# Patient Record
Sex: FEMALE | Race: WHITE | NOT HISPANIC OR LATINO | Employment: FULL TIME | URBAN - METROPOLITAN AREA
[De-identification: names, ages, dates, MRNs, and addresses within clinical notes are randomized per-mention and may not be internally consistent; named-entity substitution may affect disease eponyms.]

---

## 2022-03-24 ENCOUNTER — OFFICE VISIT (OUTPATIENT)
Dept: URGENT CARE | Facility: URGENT CARE | Age: 22
End: 2022-03-24
Payer: COMMERCIAL

## 2022-03-24 VITALS
HEART RATE: 94 BPM | WEIGHT: 188 LBS | SYSTOLIC BLOOD PRESSURE: 142 MMHG | TEMPERATURE: 98.7 F | OXYGEN SATURATION: 97 % | DIASTOLIC BLOOD PRESSURE: 89 MMHG

## 2022-03-24 DIAGNOSIS — J02.9 VIRAL PHARYNGITIS: Primary | ICD-10-CM

## 2022-03-24 LAB — DEPRECATED S PYO AG THROAT QL EIA: NEGATIVE

## 2022-03-24 PROCEDURE — 99212 OFFICE O/P EST SF 10 MIN: CPT | Performed by: STUDENT IN AN ORGANIZED HEALTH CARE EDUCATION/TRAINING PROGRAM

## 2022-03-24 PROCEDURE — 87651 STREP A DNA AMP PROBE: CPT | Performed by: STUDENT IN AN ORGANIZED HEALTH CARE EDUCATION/TRAINING PROGRAM

## 2022-03-24 RX ORDER — ALBUTEROL SULFATE 0.83 MG/ML
2.5 SOLUTION RESPIRATORY (INHALATION)
COMMUNITY
Start: 2021-04-02

## 2022-03-24 NOTE — PROGRESS NOTES
ASSESSMENT/PLAN:  (J02.9) Viral pharyngitis  (primary encounter diagnosis)  Comment: Sore throat for last 2 days with friend that was positive for strep throat yesterday.  Also with rhinorrhea and sinus congestion.  History of allergies.  Rapid strep negative awaiting confirmation of final.  Overall most consistent with viral pharyngitis  Plan:   -Streptococcus A Rapid Screen w/Reflex to PCR - Clinic Collect, Group A Streptococcus PCR Throat Swab  -Counseled on supportive cares including OTC meds      Appropriate PPE worn.    Options for treatment and follow-up care were reviewed with the patient and/or guardian. Alannah Rubio and/or guardian engaged in the decision making process and verbalized understanding of the options discussed and agreed with the final plan.    See [facility] for orders, medications, letters, patient instructions  This note was dictated with Fiestah.      Edward Monge MD    SUBJECTIVE:  Alannah Rubio is an 21 year old female who presents for sore throat.    Started 2 days ago.   Thought at first it was just allergies but sore throat has persisted.  Also with some rhinorrhea and nasal congestion but denies other viral symptoms.  Friend was just diagnosed with strep throat.  Patient has had strep throat before but it has been a while.  Still has tonsils and.  No fever chills at home.  Has not been taking anything.    PMH:   has no past medical history on file.  There is no problem list on file for this patient.    Social History     Socioeconomic History     Marital status: Single     Spouse name: Not on file     Number of children: Not on file     Years of education: Not on file     Highest education level: Not on file   Occupational History     Not on file   Tobacco Use     Smoking status: Never Smoker     Smokeless tobacco: Never Used   Substance and Sexual Activity     Alcohol use: Not on file     Drug use: Not on file     Sexual activity: Not on file   Other Topics  Concern     Not on file   Social History Narrative     Not on file     Social Determinants of Health     Financial Resource Strain: Not on file   Food Insecurity: Not on file   Transportation Needs: Not on file   Physical Activity: Not on file   Stress: Not on file   Social Connections: Not on file   Intimate Partner Violence: Not on file   Housing Stability: Not on file     No family history on file.    ALLERGIES:  Patient has no known allergies.    Current Outpatient Medications   Medication     albuterol (PROVENTIL) (2.5 MG/3ML) 0.083% neb solution     No current facility-administered medications for this visit.         ROS:  ROS is done and is negative for general/constitutional, eye, ENT, Respiratory, cardiovascular, GI, , Skin, musculoskeletal except as noted elsewhere.  All other review of systems negative except as noted elsewhere.    OBJECTIVE:  BP (!) 142/89 (BP Location: Right arm, Patient Position: Sitting, Cuff Size: Adult Small)   Pulse 94   Temp 98.7  F (37.1  C) (Tympanic)   Wt 85.3 kg (188 lb)   SpO2 97%   General: Sitting comfortably. No acute distress.   HEENNT: Conjunctivae are clear without discharge or erythema.  Posterior pharynx with erythema but no exudate; tonsils +1.  Mild nontender bilateral cervical adenopathy.  Respiratory: No respiratory distress.   Cardiac: Warm and well perfused.   Extremities: Upper and lower extremities grossly normal.  Skin: Skin in warm without rashes.  Neurological: Motor function is grossly normal.   Psychiatric: Good insight and judgement.      RESULTS  Results for orders placed or performed in visit on 03/24/22   Streptococcus A Rapid Screen w/Reflex to PCR - Clinic Collect     Status: Normal    Specimen: Throat; Swab   Result Value Ref Range    Group A Strep antigen Negative Negative     No results found for this or any previous visit (from the past 48 hour(s)).

## 2022-03-25 LAB — GROUP A STREP BY PCR: NOT DETECTED

## 2023-06-06 ENCOUNTER — DOCUMENTATION ONLY (OUTPATIENT)
Dept: TRANSPLANT | Facility: CLINIC | Age: 23
End: 2023-06-06
Payer: COMMERCIAL

## 2023-06-06 ENCOUNTER — TELEPHONE (OUTPATIENT)
Dept: TRANSPLANT | Facility: CLINIC | Age: 23
End: 2023-06-06
Payer: COMMERCIAL

## 2023-06-06 NOTE — TELEPHONE ENCOUNTER
"Donor Intake Start: 23 Donor Intake Complete: 23  Gender: Female Preferred Language: English  Full Name: Alannah Rubio Is  Needed: [not answered]  E-mail: [email or web address] Phone Number: [phone]  Secondary Phone:  Contact Preference: [not answered] Best Contact Time: 10am - 5pm  Emergency Contact: Kristin Rubio Emergency Contact #: [phone]  Relationship to Contact: Contact is my parent  : 89 Age: 22  Address: [address] [place] [address] City: [place]  State: [place] Postal Code: [id number]  Height: 5'5\" Weight: 187lbs  BMI: 31.1  Employment Status: Employed Has PTO for donation? No, not reimbursed  Occupation: Ticket Operations Requires Heavy Lifting? No  Education Level: Four Year Degree Marital Status: Single  Exercise Routine: 4-6/Week Health Insurance: Yes  Blood Type: Unknown Ethnicity/Race: White  Donor Type: Standard Voucher Donor  Prefer Remote Donation: [not answered]  Physician: Rachell Moran<br/>KATHRYN Monterroso  Donating for Local Recipient  Recipient's Name: Mely Lambert Recipient's : Doesn't know  Recipient's Status: Patient on dialysis. How Candidate Knows Recip: Other  Candidate communicates w/  Recip: Several Times Per Month  Possible Interest In:  Motivation to donate: I wanna save my cousins life  Living Donor Pre-Screening  Is In [place]  Will accept blood transfusions? Yes  Has been diagnosed with kidney disease? No  Has had a heart attack? No  Has Diabetes (High BGs)? No  Has had cancer? No  Has had kidney stones? No  Has ever been pregnant? No  Is Planning on Pregnancy? No  Is Taking Birth Control? No  Has Used Tobacco? No  Has HIV? No  Is Currently Incarcerated? No  Is Currently Residing in [place]  History Misc  Has Allergies? Yes  Allergy  Dogs, cats, horse, pollen, grasses, hay  Has had Surgeries? Yes  Surgery When  Knee , 2016  Takes Medication? No  Medical History  History of High BP? Never  History of CABG (bypass surgery)? " No  History of blood clots? Never  History of coronary disease? Never  History of high cholesterol or triglycerides? Never  Has stents implanted? No  History of chest pain during exercise? No  History of chest pain at other times? No  Results of climbing 2 flights of stairs? Shortness Of Breath  Had stress test in last year? No  Has had stroke? No  Has had leg bypass? No  History of lung disease? Never  History of COPD? Never  History of TB? Never  History of Pneumonia? Never  Has respiratory issues? Yes   - respiratory issues? Asthma  Has HIV? No  Has Gastro Issues? Yes   - gastro issues? Reflux, Constipation  History of Gallstones? Never  History of Pancreatitis? Never  History of Liver Disease? Never  History of Hepatitis B? Never  History of Hepatitis C? Never  History of bleeding problems? Never  History of UTIs? No  History of kidney damage? Never  History of Proteinuria? Never  History of Hematuria? Never  History of neuro disease? Never  History of seizure? Never  History of lupus? Never  History of paralysis? Never  History of arthritis? Never  History of neuropathy? Never  History of depression? Never  History of anxiety? Never  History of documented psychiatric illness? Never  History of Fibroid Uterus? Never  History of Endometriosis? Never  History of Polycystic Ovaries? Never  Has had Miscarriages? No  Has had abortions? No  Has had transfusions? No  History of Obesity? No  History of Fabry's Disease? No  History of Sickle Cell Disease? No  History of Sickle Cell Trait? No  History of Sarcoidosis? No  Has auto-immune disease? No  Has had Physical Exam? Yes   - how many years ago: 1  Has had Mammogram? No  Has had Pap Smear? Yes   - how many years ago: 3  Colonoscopy? Yes   - how many years ago: 1  Medical history comments? [no comments]  Living Donor Family Medical History  Anyone with kidney disease? No  Anyone with liver disease? No  Anyone with heart disease? No  Anyone with coronary artery disease?  No  Anyone with high blood pressure? Yes   - which family members: Grandma  Anyone with blood disorder? No  Anyone with cancer? Yes   - which family members: Grandma  Anyone with kidney cancer? No  Anyone with diabetes? No  Is mother alive? Yes  Mother's age? 53  Is father alive? Yes  Father's age? 55  How many siblings? 2  How many adult children? 0  How many children under 18? 0  Social History  Has Used Alcohol? Yes   - currently uses alcohol: Yes   - how much: 2/Weekly  Has Abused Alcohol? No  Has Used Drugs? Yes  Drugs Used Last Used Got Treatment?  Marijuana Current (less than 1yr) No  Has had legal issues w/ law enforcement? No  Traveled over 100 miles from home in last year? No  Has had suicidal thoughts or attempts in the last five years? No

## 2023-06-07 ENCOUNTER — TELEPHONE (OUTPATIENT)
Dept: TRANSPLANT | Facility: CLINIC | Age: 23
End: 2023-06-07
Payer: COMMERCIAL

## 2023-06-07 NOTE — TELEPHONE ENCOUNTER
Initial Independent Living Donor Advocate contact made with potential donor today.  I introduced myself and my role during the donation process, includin.  SYED ROLE   The federal government requires that all licensed transplant centers provide the living donor with an Independent Living Donor Advocate (SYED).  I do not meet recipients or attend meetings that discuss their care or decision to transplant them. My role is separate to avoid any conflict of interest.  My role is to ensure:  1) your rights are protected;  2) you get all the information you need from the transplant team to make a fully informed decision whether to donate;   3) that living donation is in your best interest.   4) that you have the right to decide NOT to go forward with living donation at any time during this process.  I am available to you throughout the workup, during surgery phase and follow-up at home.     2. WORKUP & PRIVACY     Your identity and workup are not shared with the recipient at any time.     The recipient's insurance covers the medical expenses related to the donor evaluation and surgery.  However, it is important that you carry your own health insurance to address any medical issues that are found and are NOT related to living donation.  Additionally, age appropriate cancer screening (I.e. mammograms,  colonoscopies, etc) is required and would be through your insurance.    There is a psychosocial and medical donor workup that consists of testing to determine if you are healthy enough to donate. Workup tests include tissue typing/genetics, many blood draws, urine collection/ (kidney function testing), chest x-ray, EKG/other heart testing, CT scan. Age appropriate cancer screening is required and would be through your insurance. As you complete each step then you may move on to the next.  Workup can take as little or as long as you need and you can stop the process at any time. Transplant is a treatment option, not a  cure. A kidney from a living kidney donor can last 12-14 years.  Other treatment options are  donation and two types of dialysis.     This is major surgery and your estimated hospital stay is approximately 1-2 nights.  After surgery, there are driving and lifting restrictions - no driving for two weeks and no lifting over ten pounds for 8 weeks.  Donors are routinely off from work for 4 - 6 weeks after surgery, and potentially longer if they have a physical job.       If you anticipate lost wages due to donation, donor wage reimbursement options may be available to you and will be reviewed with you during the evaluation process. Donor Shield and NLDAC explained.    We reviewed the importance of completing follow-up labs and surveys at six months, 1 year and 2 years after donation to monitor kidney health and the impact donation has had on their life post donation.       3.  QUESTIONS    Have you received the Welcome e-mail that includes copies of the informed consent, financial letter, information on donor shield and NLDAC from the transplant department? Yes.    Have you discussed with anyone your potential decision to donate?   Yes.    Is anyone pressuring or coercing you to donate? No.    Have you discussed any financial arrangements with recipient around donating a kidney? No.    Are you aware that you can confidentially opt out at any time, up to and including day of donation? Yes.    At this time, would you like to proceed with the [email or web address] evaluation to see if you can be a kidney donor?  Yes.    If yes, I will make an appointment for your donor coordinator to reach out to you with next steps.     Contact information for SYED's was provided     Cally Valdez- [phone]  May Lewis- [phone]      Time frame for dtdonation: tbd  Paired exchange was introduced  Yes.      MyChart was initiated  Yes.  CareEverywhere was initiated Yes.    SYED NOTES: Alannah is interested in being evaluated as a  potential kidney donor for her cousin, Mely. Numerous other family members are also initiating the process. She is not interested in PEP at this time.  Family supportive.  She read information that we sent re: donation but was not able to create an accoutn with [facility] (did not receive an authentication code).  I will ask Jasiel about who can help her with this.  Appt with Barbra scheduled for Monday, 6/1/9/23 at 11AM.  I sent email to her confirming appt, and provided her with contact info for Flaco and Shailesh.      Duration of call 35 minutes

## 2023-06-19 NOTE — TELEPHONE ENCOUNTER
Contacted Alannah Rubio to introduce myself and my role, review of medical/surgical/family history and next steps.     Alannah Rubio  is aware She can stop donor evaluation at any time.    Have you ever been positive for COVID 19? yes    Have you received the COVID vaccination series? yes     Reviewed risk of COVID-19 to living donors.    Regular blood donor? No     Alannah Rubio is a 23 year old female  ABO unknown that would like to learn more about kidney donation for her cousin.     Concerns from medical/surgical/family history: knee surgery, exercise induced asthma    Current medications and NSAID use: none    Legal issues w/ law enforcement: none    Reviewed any history of travel in endemic areas: [place], [place]  Strongyloides- [place], [place] and [place].  Trypanosoma cruzi ([place])- [place]  West Nile Virus- [place], [place], [place], [place] and [place].     Per our Phase 1 algorithm, does not meet criteria to do preliminary testing.     Reviewed evaluation testing: Iohexol, Lab work, CXR, EKG, Provider visits and functions, CT Angiogram.     Reviewed operations of selection committee and angio review meetings and the need for multidisciplinary input. Post-donation requirements include post-op appointment with your surgeon at 2 weeks after surgery, 6 week, 6 month, 1 year and 2 year lab tests.     Reviewed NKR listing and transfer of care to KPD team if approved. Provided Alannah with NKR website to review.     Briefly went over options if approved of NDD and voucher donation.     Alannah would like to proceed with next steps: phase 1 testing as at this time she is only interested in direct donatio and does not know her blood type. She may be open to PEP, not totally against it.      Confirmed that Alannah reviewed Informed consent document and all questions answered.  Reviewed that they will receive Docusign to obtain electronic signature for the following:  Informed consent, SRTR data, FILIBERTO for medical information, Auth for Electronic communication and will need their signed consent back before proceeding with evaluation.      Encouraged sign up for MyChart and reviewed importance of watching teaching videos prior to evaluation.     Verified recipient status if not NDD.    Donor timeline: TBD     Will send orders to scheduling team to set up for phase 1 testing.

## 2023-06-20 ENCOUNTER — DOCUMENTATION ONLY (OUTPATIENT)
Dept: TRANSPLANT | Facility: CLINIC | Age: 23
End: 2023-06-20
Payer: COMMERCIAL

## 2023-06-20 DIAGNOSIS — Z00.5 TRANSPLANT DONOR EVALUATION: ICD-10-CM

## 2023-07-13 ENCOUNTER — LAB (OUTPATIENT)
Dept: LAB | Facility: CLINIC | Age: 23
End: 2023-07-13

## 2023-07-13 ENCOUNTER — ALLIED HEALTH/NURSE VISIT (OUTPATIENT)
Dept: TRANSPLANT | Facility: CLINIC | Age: 23
End: 2023-07-13
Payer: COMMERCIAL

## 2023-07-13 VITALS
BODY MASS INDEX: 32.36 KG/M2 | HEIGHT: 65 IN | SYSTOLIC BLOOD PRESSURE: 110 MMHG | DIASTOLIC BLOOD PRESSURE: 73 MMHG | WEIGHT: 194.2 LBS

## 2023-07-13 DIAGNOSIS — Z00.5 TRANSPLANT DONOR EVALUATION: ICD-10-CM

## 2023-07-13 DIAGNOSIS — Z01.818 KIDNEY LIVING DONOR EVALUATION, PRE-OP: Primary | ICD-10-CM

## 2023-07-13 LAB
ABO/RH(D): NORMAL
ALBUMIN MFR UR ELPH: 11.4 MG/DL
ALBUMIN UR-MCNC: NEGATIVE MG/DL
APPEARANCE UR: CLEAR
BILIRUB UR QL STRIP: NEGATIVE
COLOR UR AUTO: YELLOW
CREAT SERPL-MCNC: 0.81 MG/DL (ref 0.51–0.95)
CREAT UR-MCNC: 233 MG/DL
CREAT UR-MCNC: 235 MG/DL
GFR SERPL CREATININE-BSD FRML MDRD: >90 ML/MIN/1.73M2
GLUCOSE UR STRIP-MCNC: NEGATIVE MG/DL
HGB BLD-MCNC: 13.1 G/DL (ref 11.7–15.7)
HGB UR QL STRIP: ABNORMAL
KETONES UR STRIP-MCNC: NEGATIVE MG/DL
LEUKOCYTE ESTERASE UR QL STRIP: NEGATIVE
MICROALBUMIN UR-MCNC: <12 MG/L
MICROALBUMIN/CREAT UR: NORMAL MG/G{CREAT}
MUCOUS THREADS #/AREA URNS LPF: PRESENT /LPF
NITRATE UR QL: NEGATIVE
PH UR STRIP: 6 [PH] (ref 5–7)
PROT/CREAT 24H UR: 0.05 MG/MG CR (ref 0–0.2)
RBC URINE: 1 /HPF
SP GR UR STRIP: 1.02 (ref 1–1.03)
SPECIMEN EXPIRATION DATE: NORMAL
SQUAMOUS EPITHELIAL: 3 /HPF
UROBILINOGEN UR STRIP-MCNC: NORMAL MG/DL
WBC URINE: 1 /HPF

## 2023-07-13 PROCEDURE — 99207 PR NO BILLABLE SERVICE THIS VISIT: CPT

## 2023-07-13 PROCEDURE — 81001 URINALYSIS AUTO W/SCOPE: CPT | Performed by: PATHOLOGY

## 2023-07-13 PROCEDURE — 82570 ASSAY OF URINE CREATININE: CPT | Performed by: SURGERY

## 2023-07-13 PROCEDURE — 85018 HEMOGLOBIN: CPT | Performed by: PATHOLOGY

## 2023-07-13 PROCEDURE — 84156 ASSAY OF PROTEIN URINE: CPT | Performed by: PATHOLOGY

## 2023-07-13 PROCEDURE — 82565 ASSAY OF CREATININE: CPT | Performed by: PATHOLOGY

## 2023-07-13 PROCEDURE — 36415 COLL VENOUS BLD VENIPUNCTURE: CPT | Performed by: PATHOLOGY

## 2023-07-13 PROCEDURE — 99000 SPECIMEN HANDLING OFFICE-LAB: CPT | Performed by: PATHOLOGY

## 2023-07-13 PROCEDURE — 86900 BLOOD TYPING SEROLOGIC ABO: CPT | Performed by: SURGERY

## 2023-07-13 NOTE — NURSING NOTE
"Pre donor height, weight, and BPs taken    BP 1 @ 10:20: 122/82  BP 2 @ 10:35: 113/76  BP 3 @ 10:50: 110/73    Height: 5'5\"  Weight: 194.2lbs    Karin Mtz  RN, BSN  RN Clinic Coordinator:   [facility]  [facility] [place]   [email or web address]  Phone: [phone]   Fax: [phone]      "

## 2023-07-24 DIAGNOSIS — Z00.5 TRANSPLANT DONOR EVALUATION: Primary | ICD-10-CM

## 2023-07-24 RX ORDER — ALBUTEROL SULFATE 0.83 MG/ML
2.5 SOLUTION RESPIRATORY (INHALATION)
Status: CANCELLED | OUTPATIENT
Start: 2023-08-10

## 2023-07-24 RX ORDER — DIPHENHYDRAMINE HYDROCHLORIDE 50 MG/ML
50 INJECTION INTRAMUSCULAR; INTRAVENOUS
Status: CANCELLED
Start: 2023-08-10

## 2023-07-24 RX ORDER — METHYLPREDNISOLONE SODIUM SUCCINATE 125 MG/2ML
125 INJECTION, POWDER, LYOPHILIZED, FOR SOLUTION INTRAMUSCULAR; INTRAVENOUS
Status: CANCELLED
Start: 2023-08-10

## 2023-07-24 RX ORDER — MEPERIDINE HYDROCHLORIDE 25 MG/ML
25 INJECTION INTRAMUSCULAR; INTRAVENOUS; SUBCUTANEOUS EVERY 30 MIN PRN
Status: CANCELLED | OUTPATIENT
Start: 2023-08-10

## 2023-07-24 RX ORDER — EPINEPHRINE 1 MG/ML
0.3 INJECTION, SOLUTION, CONCENTRATE INTRAVENOUS EVERY 5 MIN PRN
Status: CANCELLED | OUTPATIENT
Start: 2023-08-10

## 2023-07-24 RX ORDER — ALBUTEROL SULFATE 90 UG/1
1-2 AEROSOL, METERED RESPIRATORY (INHALATION)
Status: CANCELLED
Start: 2023-08-10

## 2023-08-03 ENCOUNTER — DOCUMENTATION ONLY (OUTPATIENT)
Dept: TRANSPLANT | Facility: CLINIC | Age: 23
End: 2023-08-03
Payer: COMMERCIAL

## 2023-08-03 NOTE — PROGRESS NOTES
Talked with Alannah in regards to next steps. Since another donor has donated for Mely Jaffe would like to withdraw at this time. Let Alannah know she can reach out in the future if she would like to donate as a voucher donor. Alannah verbalized understanding and is in agreement with plan.

## 2023-08-13 ENCOUNTER — HEALTH MAINTENANCE LETTER (OUTPATIENT)
Age: 23
End: 2023-08-13

## 2024-10-06 ENCOUNTER — HEALTH MAINTENANCE LETTER (OUTPATIENT)
Age: 24
End: 2024-10-06